# Patient Record
Sex: FEMALE | Race: WHITE
[De-identification: names, ages, dates, MRNs, and addresses within clinical notes are randomized per-mention and may not be internally consistent; named-entity substitution may affect disease eponyms.]

---

## 2019-10-18 NOTE — BD
BONE DENSITOMETRY USING DEXA:

 

Date:  10/18/19 

 

HISTORY:  

Postmenopausal screening for osteoporosis. 

 

FINDINGS:

 

Lumbar Spine:       BMD (g/cm2)

    L1              0.723         T-Score:  -2.4   Z-Score:  -1.0 

    L2              0.733         T-Score:  -2.7   Z-Score:  -1.1 

    L3              0.689         T-Score:  -3.6   Z-Score:  -2.0

    L4              0.714         T-Score:  -3.2   Z-Score:  -1.5 

    L1-L4           0.714         T-Score:  -3.0   Z-Score:  -1.5 

 

Femoral Neck:       0.554         T-Score:  -2.7   Z-Score:  -1.3 

Total Femur:        0.691         T-Score:  -2.1   Z-Score:  -1.0

 

The 10 year fracture risk for a major osteoporotic fracture is 11% and for a hip fracture is 2.4%. 

 

IMPRESSION: 

Osteoporosis. 

 

 

POS: [facility]

## 2021-01-26 NOTE — MMO
Bilateral MAMMO Bilat Screen [id number].

 

CLINICAL HISTORY:

Patient is 65 years old and is seen for screening. The patient has no family

history of breast cancer.  The patient has no personal history of cancer.

 

VIEWS:

The views performed were:  bilateral craniocaudal with tomosynthesis and

bilateral mediolateral oblique with tomosynthesis.

 

FILMS COMPARED:

The present examination has been compared to prior imaging studies performed at

[facility] on 06/27/2008 and 07/25/2014, and at [facility] on 01/25/2011 and 07/22/2013.

 

This study has been interpreted with the assistance of computer-aided detection.

 

MAMMOGRAM FINDINGS:

The breasts are extremely dense, which may lower the sensitivity of mammography.

 

Finding 1:  There are stable benign appearing calcifications seen in both

breasts.

 

Finding 2:  There are stable benign appearing densities seen in both breasts.

 

There are no suspicious masses, suspicious calcifications, or new areas of

architectural distortion.

 

IMPRESSION:

THERE IS NO MAMMOGRAPHIC EVIDENCE OF MALIGNANCY.

 

A ROUTINE FOLLOW-UP MAMMOGRAM IN 1 YEAR IS RECOMMENDED.

 

THE RESULTS OF THIS EXAM WERE SENT TO THE PATIENT.

 

ACR BI-RADS Category 2 - Benign finding

 

MAMMOGRAPHY NOTE:

 1. A negative mammogram report should not delay a biopsy if a dominant of

 clinically suspicious mass is present.

 2. Approximately 10% to 15% of breast cancers are not detected by

 mammography.

 3. Adenosis and dense breasts may obscure an underlying neoplasm.

 

 

Reported by: LEV MCCULLOUGH MD

Electonically Signed: [id number]

## 2022-03-01 ENCOUNTER — HOSPITAL ENCOUNTER (OUTPATIENT)
Dept: HOSPITAL 92 - BICMAMMO | Age: 67
Discharge: HOME | End: 2022-03-01
Attending: OBSTETRICS & GYNECOLOGY
Payer: COMMERCIAL

## 2022-03-01 DIAGNOSIS — Z12.31: Primary | ICD-10-CM

## 2022-03-01 PROCEDURE — 77067 SCR MAMMO BI INCL CAD: CPT

## 2022-03-01 PROCEDURE — 77063 BREAST TOMOSYNTHESIS BI: CPT

## 2022-10-28 ENCOUNTER — HOSPITAL ENCOUNTER (OUTPATIENT)
Dept: HOSPITAL 92 - BICMAMMO | Age: 67
Discharge: HOME | End: 2022-10-28
Attending: INTERNAL MEDICINE
Payer: MEDICARE

## 2022-10-28 DIAGNOSIS — M85.852: ICD-10-CM

## 2022-10-28 DIAGNOSIS — M81.8: Primary | ICD-10-CM

## 2022-10-28 PROCEDURE — 77080 DXA BONE DENSITY AXIAL: CPT

## 2024-10-29 ENCOUNTER — HOSPITAL ENCOUNTER (OUTPATIENT)
Dept: HOSPITAL 92 - BICMAMMO | Age: 69
Discharge: HOME | End: 2024-10-29
Attending: INTERNAL MEDICINE
Payer: MEDICARE

## 2024-10-29 DIAGNOSIS — M81.8: Primary | ICD-10-CM

## 2024-10-29 DIAGNOSIS — M85.852: ICD-10-CM

## 2024-10-29 PROCEDURE — 77080 DXA BONE DENSITY AXIAL: CPT
